# Patient Record
Sex: FEMALE | URBAN - METROPOLITAN AREA
[De-identification: names, ages, dates, MRNs, and addresses within clinical notes are randomized per-mention and may not be internally consistent; named-entity substitution may affect disease eponyms.]

---

## 2018-11-30 ENCOUNTER — IMPORTED ENCOUNTER (OUTPATIENT)
Dept: URBAN - METROPOLITAN AREA CLINIC 1 | Facility: CLINIC | Age: 38
End: 2018-11-30

## 2018-11-30 PROBLEM — H52.13: Noted: 2018-11-30

## 2018-11-30 PROCEDURE — S0620 ROUTINE OPHTHALMOLOGICAL EXA: HCPCS

## 2018-11-30 NOTE — PATIENT DISCUSSION
1. Myopia: Rx was given for correction if indicated and requested. Return for an appointment in 1 year 36 with Dr. Tamiko Gonzalez.

## 2022-04-02 ASSESSMENT — VISUAL ACUITY
OD_CC: 20/50
OS_CC: 20/40

## 2022-04-02 ASSESSMENT — TONOMETRY
OD_IOP_MMHG: 17
OS_IOP_MMHG: 18